# Patient Record
Sex: MALE | Race: WHITE
[De-identification: names, ages, dates, MRNs, and addresses within clinical notes are randomized per-mention and may not be internally consistent; named-entity substitution may affect disease eponyms.]

---

## 2019-11-23 ENCOUNTER — HOSPITAL ENCOUNTER (EMERGENCY)
Dept: HOSPITAL 25 - ED | Age: 24
Discharge: HOME | End: 2019-11-23
Payer: COMMERCIAL

## 2019-11-23 VITALS — SYSTOLIC BLOOD PRESSURE: 132 MMHG | DIASTOLIC BLOOD PRESSURE: 81 MMHG
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
## 2019-11-23 DIAGNOSIS — S61.412A: Primary | ICD-10-CM
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
## 2019-11-23 DIAGNOSIS — Z88.1: ICD-10-CM

## 2019-11-23 DIAGNOSIS — S61.211A: ICD-10-CM

## 2019-11-23 PROCEDURE — 99282 EMERGENCY DEPT VISIT SF MDM: CPT

## 2019-11-23 NOTE — ED
Laceration/Wound HPI





- HPI Summary


HPI Summary: 


This patient marco  24 year old male presenting to [facility] with a chief complaint 

of left thumb webbing and index finger lacerations. He states he was holding a 

xuan jar and accidently tapped it on the counter causing it to shatter. He 

does not know if there is still glass in the wound. Medications reviewed, 

allergies noted. 





- History of Current Complaint


Stated Complaint: LT HAND LAC PER PT


Time Seen by Provider: 11/23/19 17:18


Hx Obtained From: Patient


Onset/Duration: Lasting Minutes


Pain Intensity: 2


Pain Scale Used: 0-10 Numeric





- Allergy/Home Medications


Allergies/Adverse Reactions: 


 Allergies











Allergy/AdvReac Type Severity Reaction Status Date / Time


 


cefixime Allergy  Hives Verified 11/23/19 17:14














PMH/Surg Hx/FS Hx/Imm Hx


Endocrine/Hematology History: 


   Denies: Hx Diabetes


Cardiovascular History: 


   Denies: Hx Coronary Artery Disease


Infectious Disease History: No


Infectious Disease History: 


   Denies: Traveled Outside the [place] in Last 30 Days





- Family History


Known Family History: 


   Negative: Cardiac Disease





- Social History


Occupation: Student


Lives: Dormitory/Roommates





Review of Systems


Negative: Fever


Positive: Other - Lacerations


All Other Systems Reviewed And Are Negative: Yes





Physical Exam





- Summary


Physical Exam Summary: 





Constitutional: Well-developed, Well-nourished, Alert, Cooperative


Skin: Warm, Dry. 2 cm laceration on the webbing of the left thumb. Full ROM in 

the left thumb. 0.5 cm laceration at the doan aspect of the 2nd finger. 


HENT: Normocephalic; No Racoons eyes; No hankins's sign; No abrasion; No 

contusion; No hemotympanum; No maxilla facial tenderness or instability; 

Dentition are smooth; No dental trauma; No trismus


Eyes: EOM normal, PERRL 


Neck: Trachea is midline. No stridor; No JVD; No step off; No posterior 

cervical spine tenderness


Cardio: Rhythm regular, rate normal Heart sounds normal; Intact distal pulses. 

Radial pulses are 2+ and symmetric.


Pulmonary/Chest wall: Effort normal; Breath sounds normal; Equal chest rise; No 

flail segment; No rib tenderness; No sternal tenderness


Abd: Soft, Appearance normal. No distension; No tenderness; No palpable 

pulsatile mass; No Cullens sign; No Grey-Turners sign


Musculoskeletal: Full ROM and no tenderness at hips, ankles, shoulders, elbows 

and knees; No joint swelling; No vertebral body tenderness; No paraspinal 

tenderness; No step off or deformity of the spine; Pelvis is stable to lateral 

compression and rock


Neuro: Alert, Oriented x3, Strength 5/5 all extremities.


: No blood at urethral meatus


Psych: Mood and affect Normal


 





Triage Information Reviewed: Yes


Vital Signs On Initial Exam: 


 Initial Vitals











Temp Pulse Resp BP Pulse Ox


 


 99.2 F   104   16   134/105   100 


 


 11/23/19 17:12  11/23/19 17:12  11/23/19 17:12  11/23/19 17:12  11/23/19 17:12











Vital Signs Reviewed: Yes





Procedures





- Sedation


Patient Received Moderate/Deep Sedation with Procedure: No





Diagnostics





- Vital Signs


 Vital Signs











  Temp Pulse Resp BP Pulse Ox


 


 11/23/19 17:12  99.2 F  104  16  134/105  100














- Laboratory


Lab Statement: Any lab studies that have been ordered have been reviewed, and 

results considered in the medical decision making process.





Laceration Repair Course/Dx





- Course


Course Of Treatment: Patient is here 2 small lacerations to his left hand.  

Patient had successful repair of his lacerations.  Patient is up-to-date on 

tetanus.





- Clinical Impression


Provider Diagnoses: 


 Laceration








Discharge ED





- Sign-Out/Discharge


Documenting (check all that apply): Patient Departure - Discharge





- Discharge Plan


Condition: Stable


Disposition: HOME


Patient Education Materials:  Laceration (ED)


Referrals: 


No Primary Care Phys,NOPCP [Primary Care Provider] - 


Additional Instructions: 


Return with redness, puss, fever. Get your stitches removed in 10 days at 

[facility], [facility]. Keep your stitches dry, but you are allowed to wash them in the 

shower. 





- Billing Disposition and Condition


Condition: STABLE


Disposition: Home





- Attestation Statements


Document Initiated by Scribe: Yes


Documenting Scribe: Brooks Leal


Provider For Whom Alyssa is Documenting (Include Credential): Ye Palumbo MD


Scribe Attestation: 


Brooks THOMPSON, scribed for Ye Palumbo MD on 11/23/19 at 1836. 


Scribe Documentation Reviewed: Yes


Provider Attestation: 


The documentation as recorded by the Brooks mcgee accurately 

reflects the service I personally performed and the decisions made by me, Ye Palumbo MD


Status of Scribe Document: Viewed